# Patient Record
Sex: FEMALE | Race: WHITE | NOT HISPANIC OR LATINO | URBAN - METROPOLITAN AREA
[De-identification: names, ages, dates, MRNs, and addresses within clinical notes are randomized per-mention and may not be internally consistent; named-entity substitution may affect disease eponyms.]

---

## 2019-06-03 ENCOUNTER — COMMUNICATION - HEALTHEAST (OUTPATIENT)
Dept: SCHEDULING | Facility: CLINIC | Age: 64
End: 2019-06-03

## 2019-06-28 ASSESSMENT — MIFFLIN-ST. JEOR: SCORE: 1351.94

## 2019-07-01 ENCOUNTER — SURGERY - HEALTHEAST (OUTPATIENT)
Dept: SURGERY | Facility: HOSPITAL | Age: 64
End: 2019-07-01

## 2019-07-01 ENCOUNTER — ANESTHESIA - HEALTHEAST (OUTPATIENT)
Dept: SURGERY | Facility: HOSPITAL | Age: 64
End: 2019-07-01

## 2019-07-01 ASSESSMENT — MIFFLIN-ST. JEOR: SCORE: 1361.92

## 2019-07-02 ASSESSMENT — MIFFLIN-ST. JEOR: SCORE: 1379.61

## 2021-05-29 NOTE — TELEPHONE ENCOUNTER
"Pt reports \"softball size hemorrhoids\" and \"they bleed a lot\". Pt states she sits on ice packs to try and shrink them and \"even when they are not bleeding there is still discharge coming out\" and \"I am very, very weak\". Pt reports \"it's been a couple of weeks\".     Advised pt to have someone else drive her to the ER now. If unable to find a  call 911 for emergency transport.    Pt verbalizes understanding and agrees to plan.       Reason for Disposition    Large mass protruding out of rectum    Protocols used: RECTAL SYMPTOMS-A-AH      "

## 2021-05-30 NOTE — ANESTHESIA POSTPROCEDURE EVALUATION
Patient: Karla J Tietz  RECTAL PROLAPSE REPAIR, PERINEAL RECTOSIGMOIDECTOMY  Anesthesia type: general    Patient location: PACU  Last vitals:   Vitals Value Taken Time   /89 7/1/2019  2:34 PM   Temp 36.7  C (98  F) 7/1/2019  2:34 PM   Pulse 83 7/1/2019  2:34 PM   Resp 18 7/1/2019  2:34 PM   SpO2 97 % 7/1/2019  2:34 PM     Post vital signs: stable  Level of consciousness: awake and responds to simple questions  Post-anesthesia pain: pain controlled  Post-anesthesia nausea and vomiting: no  Pulmonary: unassisted, return to baseline  Cardiovascular: stable and blood pressure at baseline  Hydration: adequate  Anesthetic events: no    QCDR Measures:  ASA# 11 - Jazmine-op Cardiac Arrest: ASA11B - Patient did NOT experience unanticipated cardiac arrest  ASA# 12 - Jazmine-op Mortality Rate: ASA12B - Patient did NOT die  ASA# 13 - PACU Re-Intubation Rate: ASA13B - Patient did NOT require a new airway mgmt  ASA# 10 - Composite Anes Safety: ASA10A - No serious adverse event    Additional Notes:

## 2021-05-30 NOTE — ANESTHESIA PREPROCEDURE EVALUATION
Anesthesia Evaluation      Patient summary reviewed   No history of anesthetic complications     Airway   Mallampati: II  Neck ROM: full   Pulmonary - normal exam    breath sounds clear to auscultation  (+) asthma                           Cardiovascular - negative ROS  Rhythm: regular  Rate: normal,         Neuro/Psych      Comments: Odd speech, somewhat infantile speech pattern.  PTSD  Hearing loss    Endo/Other    (+) diabetes mellitus, hypothyroidism, obesity,      GI/Hepatic/Renal    (+)   chronic renal disease (stage 3) CRI,           Dental    (+) poor dentition and upper dentures                       Anesthesia Plan  Planned anesthetic: general endotracheal  No TAP block planned unless making a large abdominal incision.  ASA 3   Induction: intravenous   Anesthetic plan and risks discussed with: patient  Anesthesia plan special considerations: antiemetics,   Post-op plan: routine recovery

## 2021-05-30 NOTE — ANESTHESIA POSTPROCEDURE EVALUATION
Patient: Karla J Tietz  RECTAL PROLAPSE REPAIR, PERINEAL RECTOSIGMOIDECTOMY  Anesthesia type: general    Patient location: PACU  Last vitals:   Vitals Value Taken Time   /89 7/1/2019  1:50 PM   Temp 37.5  C (99.5  F) 7/1/2019  1:14 PM   Pulse 84 7/1/2019  1:56 PM   Resp 0 7/1/2019  1:56 PM   SpO2 96 % 7/1/2019  1:56 PM   Vitals shown include unvalidated device data.  Post vital signs: stable  Level of consciousness: awake and responds to simple questions  Post-anesthesia pain: pain controlled  Post-anesthesia nausea and vomiting: no  Pulmonary: unassisted, return to baseline  Cardiovascular: stable and blood pressure at baseline  Hydration: adequate  Anesthetic events: no    QCDR Measures:  ASA# 11 - Jazmine-op Cardiac Arrest: ASA11B - Patient did NOT experience unanticipated cardiac arrest  ASA# 12 - Jazmine-op Mortality Rate: ASA12B - Patient did NOT die  ASA# 13 - PACU Re-Intubation Rate: ASA13B - Patient did NOT require a new airway mgmt  ASA# 10 - Composite Anes Safety: ASA10A - No serious adverse event    Additional Notes:

## 2021-05-30 NOTE — ANESTHESIA CARE TRANSFER NOTE
Last vitals:   Vitals:    07/01/19 1314   BP: 153/76   Pulse: (!) 116   Resp: 20   Temp: 37.5  C (99.5  F)   SpO2: 100%     Patient's level of consciousness is drowsy  Spontaneous respirations: yes  Maintains airway independently: yes  Dentition unchanged: yes  Oropharynx: oropharynx clear of all foreign objects    QCDR Measures:  ASA# 20 - Surgical Safety Checklist: WHO surgical safety checklist completed prior to induction    PQRS# 430 - Adult PONV Prevention: 4558F - Pt received => 2 anti-emetic agents (different classes) preop & intraop  ASA# 8 - Peds PONV Prevention: NA - Not pediatric patient, not GA or 2 or more risk factors NOT present  PQRS# 424 - Jazmine-op Temp Management: 4559F - At least one body temp DOCUMENTED => 35.5C or 95.9F within required timeframe  PQRS# 426 - PACU Transfer Protocol: - Transfer of care checklist used  ASA# 14 - Acute Post-op Pain: ASA14B - Patient did NOT experience pain >= 7 out of 10

## 2021-06-03 VITALS — BODY MASS INDEX: 39.09 KG/M2 | HEIGHT: 60 IN | WEIGHT: 199.1 LBS

## 2021-06-16 PROBLEM — E83.42 HYPOMAGNESEMIA: Status: ACTIVE | Noted: 2019-07-02

## 2021-06-16 PROBLEM — E87.6 HYPOKALEMIA: Status: ACTIVE | Noted: 2019-07-02

## 2021-06-16 PROBLEM — K62.3 RECTAL PROLAPSE: Status: ACTIVE | Noted: 2019-06-05

## 2021-06-16 PROBLEM — N18.30 CKD (CHRONIC KIDNEY DISEASE) STAGE 3, GFR 30-59 ML/MIN (H): Status: ACTIVE | Noted: 2019-07-02
